# Patient Record
Sex: FEMALE | Race: ASIAN | URBAN - METROPOLITAN AREA
[De-identification: names, ages, dates, MRNs, and addresses within clinical notes are randomized per-mention and may not be internally consistent; named-entity substitution may affect disease eponyms.]

---

## 2017-01-06 ENCOUNTER — OFFICE VISIT (OUTPATIENT)
Dept: FAMILY MEDICINE | Facility: CLINIC | Age: 42
End: 2017-01-06

## 2017-01-06 ENCOUNTER — MEDICAL CORRESPONDENCE (OUTPATIENT)
Dept: HEALTH INFORMATION MANAGEMENT | Facility: CLINIC | Age: 42
End: 2017-01-06

## 2017-01-06 VITALS
WEIGHT: 105 LBS | OXYGEN SATURATION: 99 % | DIASTOLIC BLOOD PRESSURE: 73 MMHG | HEART RATE: 63 BPM | HEIGHT: 58 IN | BODY MASS INDEX: 22.04 KG/M2 | TEMPERATURE: 97.8 F | SYSTOLIC BLOOD PRESSURE: 111 MMHG

## 2017-01-06 DIAGNOSIS — G44.219 EPISODIC TENSION-TYPE HEADACHE, NOT INTRACTABLE: ICD-10-CM

## 2017-01-06 DIAGNOSIS — F33.1 MODERATE EPISODE OF RECURRENT MAJOR DEPRESSIVE DISORDER (H): Primary | ICD-10-CM

## 2017-01-06 ASSESSMENT — ANXIETY QUESTIONNAIRES
3. WORRYING TOO MUCH ABOUT DIFFERENT THINGS: NOT AT ALL
GAD7 TOTAL SCORE: 1
5. BEING SO RESTLESS THAT IT IS HARD TO SIT STILL: NOT AT ALL
6. BECOMING EASILY ANNOYED OR IRRITABLE: NOT AT ALL
1. FEELING NERVOUS, ANXIOUS, OR ON EDGE: SEVERAL DAYS
IF YOU CHECKED OFF ANY PROBLEMS ON THIS QUESTIONNAIRE, HOW DIFFICULT HAVE THESE PROBLEMS MADE IT FOR YOU TO DO YOUR WORK, TAKE CARE OF THINGS AT HOME, OR GET ALONG WITH OTHER PEOPLE: SOMEWHAT DIFFICULT
2. NOT BEING ABLE TO STOP OR CONTROL WORRYING: NOT AT ALL
7. FEELING AFRAID AS IF SOMETHING AWFUL MIGHT HAPPEN: NOT AT ALL

## 2017-01-06 ASSESSMENT — PATIENT HEALTH QUESTIONNAIRE - PHQ9: 5. POOR APPETITE OR OVEREATING: NOT AT ALL

## 2017-01-06 NOTE — PROGRESS NOTES
Primary Care Behavioral Health Consult Note    Meeting was: unscheduled  Others present:  (Janelle Tim) and patient's son  Meeting lasted: 28 minutes    Identifying Information and Presenting Problem:    Dr. Hughes requested behavioral health consultation for this patient regarding pain management strategies and possible connection with mental health services.  The patient is a 42 year old Zoë female and agreed to be seen by behavioral health today.    Topics Discussed/Interventions Provided:       History of pain: Ma has been having headaches since the birth of her son. She believes that the headaches are caused by too much blood rushing to her head and not being in balance.     Helpful pain management strategies: (1) Medications; (2) In [place] she used herbal head wrap for headaches, but she does not know where she could receive this service in the [facility]; (3) OMT at [place]; (4) her oldest daughter pulls on her hair when headaches are severe; (5) she also demonstrated cracking her neck during our visit today when asked if she uses stretching.     Psychoeducation: Discussed interaction between pain and stress and how relaxation helps reduce both. Used her own theory for cause of pain to adapt a visualization relaxation technique. Encouraged her to rest 5 minutes daily and visualize blood flow going from her head back into her legs and feet. Also discussed enlisting her  to provide childcare during relaxation practice - she believes he'll be willing to help.     Referral: Provided referral information for [facility] to continue meeting with Ma to develop stress and pain coping strategies. She was a bit ambivalent about accepting the referral because she has never done any type of counseling, but was willing to sign the release and have them contact her. May need to revisit this referral again in the future if she does not successfully connect with them. Seems that mood and stress are playing a role  "in her experience of pain. Alternatively, she could be referred to in-house lifestyle clinic for pain management.    Assessment:     Mental Status: Romario Cervantes appeared generally alert and oriented. Dress was casual and appropriate to the weather and occasion. Grooming and hygiene were adequate. Eye contact was good. Speech was of normal volume and rate and was clear, coherent, and relevant. Mood was \"okay\" and affect appeared depressed. Thought processes were relevant, logical and goal-directed - as best can be estimated with help of . Thought content was WNL with no evidence of psychotic or paranoid features. No evidence of SI/HI or self-harm, intent, or plans. Memory appeared grossly intact. Insight and judgment appeared limited and patient exhibited good impulse control during the appointment.     Does the patient appear to be at imminent risk of harm to self/others at this time? No      PHQ-9 SCORE 10/7/2016 12/12/2016   Total Score 8 11       Diagnoses (PROVISIONAL):      Headaches  R/O Depression    Plan:      Obtained [facility] and will refer for therapy for stress coping skills. Faxed over referral after visit.     Spoke with Dr. Hughes to relay plan from today's visit for Ma to practice relaxation with visualization of blood flowing into her lower extremities 5 minutes daily.     Recommend considering referral for in-house OMT, if available.    Could refer patient for in house lifestyle clinic focused on pain management      Connie Cowan, Ph.D.  Behavioral Health Fellow    "

## 2017-01-06 NOTE — PATIENT INSTRUCTIONS
Please bring in all your medicines at your next visit    Continue taking all your mediications as directed    I would like you to see one of your psychiatrist    Reture to to clinic as needed    MENTAL HEALTH REFERRAL  Dr. Cowan sent referral to [facility].  Jazmine Roth  1/10/17

## 2017-01-06 NOTE — NURSING NOTE
name: Janelle Tim  Language: Zoë  Agency: [facility], [place]  Phone number: [phone]

## 2017-01-06 NOTE — Clinical Note
Updated to include the lifestyle recommendation. I also sent a message to Dr. Hughes so it's on her radar. In retrospect that might have been a better first step. Oh well, we'll see how she reacts to [facility].

## 2017-01-06 NOTE — Clinical Note
Dr. Saul COLBY. Will you place a mental health referral so that it's documented (in case her insurance requires it)?  Thanks!

## 2017-01-06 NOTE — Clinical Note
Hi!  This patient typically comes with [facility] Fox.  Can you have him help set up OMT visit (preferably with Dr. Jacobo or Bonilla, as he has seen them in the past), and follow up with me in 1 month?

## 2017-01-07 ASSESSMENT — PATIENT HEALTH QUESTIONNAIRE - PHQ9: SUM OF ALL RESPONSES TO PHQ QUESTIONS 1-9: 7

## 2017-01-07 ASSESSMENT — ANXIETY QUESTIONNAIRES: GAD7 TOTAL SCORE: 1

## 2017-01-07 NOTE — PROGRESS NOTES
SUBJECTIVE:  This is a 42-year-old female with history of chronic severe headaches, likely somatoform disorder, and likely major depression, who presents today for followup on headaches.  She says she feels somewhat better over the last few weeks.  She hasn't had a headache in the last week.  She noticed some weakness in the left arm and continues to have stiffness in the neck, as well as dizziness.  She feels that the medications are helping, but when asked how she takes them, it isn't clear which one she takes p.r.n. and which she takes regularly.  She doesn't have any of her medications with her today.  She says there is a small pink to brownish pill that she finds most helpful. She still takes Tylenol p.r.n. and the medication for gas and stomach upset.      She has a couple of other things going on.  She gets weakness and numbness in the left arm that happens occasionally.  She also notices that when she becomes scared or alarmed, she gets cold in the feet and hands.  This happens when the alarm goes off in her apartment complex or when something happens that concerns her about her children.  She thinks that this might be connected with her headache.  When she is notices the cold in the extremities, the headache is often worse.       She said she hasn't felt right for many years.  She said it started when she gave birth to one of the older children, the fifth child, who is now 12 years old.  When this happened, she lost a lot of blood and had to follow up regularly for hemoglobins and blood pressures.  She felt quite sick after that and that is when things started.  She became pregnant again and had a miscarriage, and that was concerning.  Headaches got worse after that, and when her youngest son (who is about five and is in the room with her today) was born that is when headaches got really bad.  She says that when she gave birth, she had a problem with too much blood going up into the head and they gave her  topical medications around the head to help with the pressure and pain.  Prior to the birth of the five-year-old, she was able to take paracetamol or Tylenol and the headaches got better.     We discussed that sometimes mood and stress can add to pain, especially to headaches.  I asked her if she was interested in doing psychotherapy.  She said that she may already be getting some kind of therapy.  She said that someone came to the house to do an evaluation and spoke with her daughter, but she doesn't recall getting actual therapy with that visit.  She is unsure what the name of the person was or how frequently they came to see her.  She is somewhat skeptical that this could be helpful in treating her pain, but she endorses the connection between times when she is alarmed and worried and her headache  .     We were able to have Dr. Cowan see her for evaluation.  She recommended [facility] for [facility] as well as an evaluation for therapy.  OMT was quite beneficial to the patient in the past, so it might be a good option for treatment.     OBJECTIVE:   VITAL SIGNS:  Reviewed.  Blood pressure is within the normal range.   GENERAL:   Fatigued-appearing Zoë female in no acute distress.   HEENT:  Pupils are equal and reactive.  Eye movements are normal.  No conjunctival erythema.  Sensation is intact.  Normal cranial nerve strength.  Normal hearing.   CARDIAC:  Heart has regular rate and rhythm with no murmurs, rubs, or gallops.   RESPIRATORY:  Lungs are clear to auscultation bilaterally with no crackles or wheezes.     LABORATORY DATA:  Labs were discussed with her from last visit.  They showed normal BMP, normal LFTs, normal A1C, normal TSH, and normal blood counts, including hemoglobin of 13 and stable.  I also reviewed an MRI of the head for evaluation of headaches and this was normal.  No evidence of ischemia, hemorrhage, or mass, although it showed mild ethmoid thickening.     ASSESSMENT AND PLAN:  A 42-year-old female  presenting for followup of chronic headaches.   1.  Chronic tension headaches.  Chronic with likely somatic component.  She had relatively good improvement with Zonegran.  The patient doesn't have her medications with her today; I'll have her continue her current medications without change until medications can be reviewed. .  I discontinued some from her med list that would be very old at this point, but I recommend that she bring her medications in next time and that we do complete medication reconciliation.   2.  History of what sounds like postpartum hemorrhage, with hypotension and anemia.  Blood counts were normal at last visit.  I think she might have had a PTSD response to the incident.  I recommended therapy.  We put referral in today and I appreciate the assistance of Dr. Cowan.     Typically, we would treat her with margareth chi and use this to reach out to the patient to help her schedule an OMT visit.  She saw Dr. Jacobo quite a bit in the past and maybe that would be a good fit to provide OMT support.  I'll reach out to him and see if he would be interested.     Morena Hughes

## 2017-01-07 NOTE — PROGRESS NOTES
I have reviewed and agree with the behavioral health fellow's documentation for this visit.  I did not personally see the patient.  Vilma Hassan, PhD., LP

## 2017-01-23 ENCOUNTER — OFFICE VISIT (OUTPATIENT)
Dept: FAMILY MEDICINE | Facility: CLINIC | Age: 42
End: 2017-01-23

## 2017-01-23 VITALS
OXYGEN SATURATION: 100 % | TEMPERATURE: 97.6 F | BODY MASS INDEX: 21.49 KG/M2 | DIASTOLIC BLOOD PRESSURE: 80 MMHG | HEART RATE: 70 BPM | SYSTOLIC BLOOD PRESSURE: 126 MMHG | WEIGHT: 102.38 LBS | HEIGHT: 58 IN

## 2017-01-23 DIAGNOSIS — M99.00 SOMATIC DYSFUNCTION OF HEAD REGION: Primary | ICD-10-CM

## 2017-01-23 DIAGNOSIS — M99.9 NONALLOPATHIC LESION OF UPPER EXTREMITIES: ICD-10-CM

## 2017-01-23 DIAGNOSIS — M99.9 NONALLOPATHIC LESION OF CERVICAL REGION: ICD-10-CM

## 2017-01-23 NOTE — PROGRESS NOTES
OMT Visit    Romario Cervantes is a 42 year old year old female who is being seen today for OMT for treatment of tension headaches.    Chief Complaint: Patient presents with:  Other: OMT      Past Medical History   Diagnosis Date     Chronic daily headache 2/24/2016       Social History     Social History     Marital Status:      Spouse Name: N/A     Number of Children: N/A     Years of Education: N/A     Social History Main Topics     Smoking status: Former Smoker     Smokeless tobacco: None      Comment: quit betel nut     Alcohol Use: No     Drug Use: No     Sexual Activity: Not Asked     Other Topics Concern     None     Social History Narrative         Current outpatient prescriptions:      ranitidine (ZANTAC) 150 MG tablet, Take 1 tablet (150 mg) by mouth At Bedtime, Disp: 90 tablet, Rfl: 1     zonisamide (ZONEGRAN) 100 MG capsule, Take 2 capsules (200 mg) by mouth daily, Disp: 30 capsule, Rfl: 0     calcium carbonate (TUMS) 500 MG chewable tablet, Take 1 tablet (500 mg) by mouth every 2 hours as needed for heartburn, Disp: 150 tablet, Rfl: 1     ibuprofen (ADVIL,MOTRIN) 600 MG tablet, Take 1 tablet (600 mg) by mouth every 6 hours as needed for moderate pain, Disp: 90 tablet, Rfl: 1     medroxyPROGESTERone (DEPO-PROVERA) 150 MG/ML injection, Inject 1 mL (150 mg) into the muscle every 3 months, Disp: 3 mL, Rfl: 3     acetaminophen (TYLENOL) 500 MG tablet, Take 2 tablets (1,000 mg) by mouth every 6 hours as needed for mild pain Max 6 tablets (3000mg) per day., Disp: 100 tablet, Rfl: 11     meclizine 25 MG CHEW, Take 1 tablet (25 mg) by mouth every 6 hours as needed, Disp: 90 tablet, Rfl: 1    Exam    General: alert, pleasant and interactive  Psych: positive mood and affect and well-groomed  Gross Exam: unlevel shoulder heights R>L  Neck: muscle spasms and tenderness suboccipital, scalene, sternoclidomastoid and right trapezius   Cervical Dysfunction: Paraspinal Muscle: left, right, fullness and  tenderness  Thoracic Dysfunction: Paraspinal Muscle: left, right, fullness and tenderness    Treatment    Verbal consent obtained to proceed with OMT treatment.    Osteopathic manipulative treatment was performed to 4 body regions including:   -head (OA)  -Cervical  -Right Upper extremity    (M99.00) Somatic dysfunction of head region  (primary encounter diagnosis)  Plan:   -OSTEOPATHIC MANIP,3-4 BODY REGN  -OA treated with suboccipital release    (M99.9) Nonallopathic lesion of cervical region  Plan: OSTEOPATHIC MANIP,3-4 BODY REGN  -cervical spine treated with direct myofascial release    (M99.07) Somatic dysfunction of upper extremities  Plan: OSTEOPATHIC MANIP,3-4 BODY REGN  -RIght trapezius muscle treated with counterstrain    Patient tolerated procedure well, reported pain scale 6/10 before treatment and 2/10 after treatment. Encouraged to follow up as needed for OMT if she continues to experience neck/back pain and headaches. Also encouraged use of heating pad as well as stretches taught during visit at home.    Kami Crystal DO

## 2017-01-24 ASSESSMENT — PATIENT HEALTH QUESTIONNAIRE - PHQ9: SUM OF ALL RESPONSES TO PHQ QUESTIONS 1-9: 6

## 2017-01-30 NOTE — PROGRESS NOTES
Preceptor attestation:  Patient seen and discussed with the resident.  Assessment and plan reviewed with resident and agreed upon.  Supervising physician: Brien Bowman  [facility]

## 2017-02-01 ENCOUNTER — OFFICE VISIT (OUTPATIENT)
Dept: FAMILY MEDICINE | Facility: CLINIC | Age: 42
End: 2017-02-01

## 2017-02-01 VITALS
OXYGEN SATURATION: 100 % | HEART RATE: 76 BPM | BODY MASS INDEX: 21.26 KG/M2 | SYSTOLIC BLOOD PRESSURE: 106 MMHG | DIASTOLIC BLOOD PRESSURE: 70 MMHG | TEMPERATURE: 97.9 F | HEIGHT: 58 IN | WEIGHT: 101.25 LBS

## 2017-02-01 DIAGNOSIS — F32.1 MODERATE SINGLE CURRENT EPISODE OF MAJOR DEPRESSIVE DISORDER (H): ICD-10-CM

## 2017-02-01 DIAGNOSIS — R51.9 CHRONIC DAILY HEADACHE: Primary | ICD-10-CM

## 2017-02-01 NOTE — MR AVS SNAPSHOT
After Visit Summary   2017    Romario Cervantes    MRN: [id number]           Patient Information     Date Of Birth          1975        Visit Information        Provider Department      2017 1:10 PM Morena Hughes MD [facility]        Care Instructions    Continue same medications.    Follow up with Dr. Crystal every 2 weeks for OMT  Follow up with Dr. Hughes in 2 months.          Follow-ups after your visit        Who to contact     Please call your clinic at [phone] to:    Ask questions about your health    Make or cancel appointments    Discuss your medicines    Learn about your test results    Speak to your doctor   If you have compliments or concerns about an experience at your clinic, or if you wish to file a complaint, please contact [facility] Patient Relations at [phone] or email us at [email or web address]         Additional Information About Your Visit        MyChart Information     [employer] is an electronic gateway that provides easy, online access to your medical records. With [employer], you can request a clinic appointment, read your test results, renew a prescription or communicate with your care team.     To sign up for Connot[employer]et visit the website at [email or web address]   You will be asked to enter the access code listed below, as well as some personal information. Please follow the directions to create your username and password.     Your access code is: [email or web address]  Expires: 2017  1:55 PM     Your access code will  in 90 days. If you need help or a new code, please contact your [facility] Clinic or call [phone] for assistance.        Care EveryWhere ID     This is your Care EveryWhere ID. This could be used by other organizations to access your [place] medical records  [id number]        Your Vitals Were     Pulse Temperature Height BMI (Body Mass Index) Pulse Oximetry  "Breastfeeding?    76 97.9  F (36.6  C) (Oral) 4' 9.75\" (146.7 cm) 21.34 kg/m2 100% No       Blood Pressure from Last 3 Encounters:   02/01/17 106/70   01/23/17 126/80   01/06/17 111/73    Weight from Last 3 Encounters:   02/01/17 101 lb 4 oz (45.927 kg)   01/23/17 102 lb 6 oz (46.437 kg)   01/06/17 105 lb (47.628 kg)              Today, you had the following     No orders found for display       Primary Care Provider Office Phone # Fax #    Morena Hughes [phone]       [facility] [address] [place] [address]        Thank you!     Thank you for choosing [facility]  for your care. Our goal is always to provide you with excellent care. Hearing back from our patients is one way we can continue to improve our services. Please take a few minutes to complete the written survey that you may receive in the mail after your visit with us. Thank you!             Your Updated Medication List - Protect others around you: Learn how to safely use, store and throw away your medicines at www.disposemymeds.org.          This list is accurate as of: 2/1/17  1:55 PM.  Always use your most recent med list.                   Brand Name Dispense Instructions for use    acetaminophen 500 MG tablet    TYLENOL    100 tablet    Take 2 tablets (1,000 mg) by mouth every 6 hours as needed for mild pain Max 6 tablets (3000mg) per day.       calcium carbonate 500 MG chewable tablet    TUMS    150 tablet    Take 1 tablet (500 mg) by mouth every 2 hours as needed for heartburn       ibuprofen 600 MG tablet    ADVIL/MOTRIN    90 tablet    Take 1 tablet (600 mg) by mouth every 6 hours as needed for moderate pain       meclizine 25 MG Chew     90 tablet    Take 1 tablet (25 mg) by mouth every 6 hours as needed       medroxyPROGESTERone 150 MG/ML injection    DEPO-PROVERA    3 mL    Inject 1 mL (150 mg) into the muscle every 3 months       ranitidine 150 MG tablet    ZANTAC    90 tablet    Take 1 tablet (150 mg) " by mouth At Bedtime       zonisamide 100 MG capsule    ZONEGRAN    30 capsule    Take 2 capsules (200 mg) by mouth daily

## 2017-02-01 NOTE — PATIENT INSTRUCTIONS
Continue same medications.    Follow up with Dr. Crystal every 2 weeks for OMT  Follow up with Dr. Hughes in 2 months.

## 2017-02-02 ASSESSMENT — PATIENT HEALTH QUESTIONNAIRE - PHQ9: SUM OF ALL RESPONSES TO PHQ QUESTIONS 1-9: 7

## 2017-02-02 NOTE — PROGRESS NOTES
"    There are no exam notes on file for this visit.  Chief Complaint   Patient presents with     Headache     follow up - a little bit better     Blood pressure 106/70, pulse 76, temperature 97.9  F (36.6  C), temperature source Oral, height 4' 9.75\" (146.7 cm), weight 101 lb 4 oz (45.927 kg), SpO2 100 %, not currently breastfeeding.    SUBJECTIVE:  This is a 42-year-old female with history of likely major depression and chronic headaches protect presents for followup.  She explains that since our last visit headaches overall haven't been bad.  She still feels very, very tired and fatigued, frequently on daily basis.  She stated that she has no energy and has difficulty sleeping at times.  Typically, the problem is with falling asleep.  She tries to go to bed when it gets dark in the evening, but she isn't able to sleep until midnight or 1:00 a.m.  This isn't an everyday thing.  Even when she gets enough sleep, she still feels worn out.  Headaches are much less regular.  When she gets them, they will last about two or three days, but then they will go away for a week or sometimes longer, and headaches aren't as frequent.       She continues to relate that this is an old problem that started after her youngest baby was born in [facility] [place].  Something happened with the delivery that caused too much fluid to her head and caused this problem.  She thinks she was too old when she had the baby.  She also continues to relate difficulties from an earlier miscarriage, as well as bleeding during delivery.  Feels that the lack of blood makes her cold and fatigued.       She feels that the medications help.  Interestingly, she says she takes her medicines every day, although she doesn't have them with her.  She shouldn't have refills left of Zonegran; I question whether she takes it on a regular basis.  She no longer takes ranitidine, per her report.  She saw Dr. Crystal for an OMT appointment, which was quite helpful.  She says " that she feels much better after the manipulation and the improvement in symptoms lasted for at least a week.  She hopes to continue this on a regular basis.     She gets mental health support at [facility].  She showed me a card today with an appointment for 2:00 p.m. on Friday 01/10 with provider Quinn Richards.  She feels that she is able to talk there, but it doesn't always help her problems.  She wonders if she can come here for those services, because then all of her care would be in one place.  I talked with Dr. Cowan, who saw her last time, and there currently aren't openings here.  Dr. Cowan feels that she should continue to receive her mental health care at [facility], as they can provide more continuous long-term care for her.     OBJECTIVE:   VITAL SIGNS:  Reviewed and they are within the normal range.  Blood pressure is appropriate.   GENERAL:  Comfortable-appearing, Zoë female in no acute distress.   PSYCHIATRIC:  Mood and affect are somewhat irritable.  She is easily distracted during our conversation.  No acute distress.     No further examination was completed today.     ASSESSMENT AND PLAN:  Romario Cervantes is a 42-year-old female presenting for followup on headaches and likely depression.   1.  Chronic headaches.  These are improving.  I recommended that she continue with the same medicines; however, she would be out of the Zonegran.  I've asked her to bring her medications with her to next visit so we can confirm these.  She says she doesn't need refills, so no further medications were sent.  We'll continue OMT, as this is quite beneficial.  She'll schedule this every two weeks with Dr. Crystal.   2.  Major depression.  She had positive Zoë screening today.  She had a positive PHQ-9.  She gets care at [facility].  She signed a release of information so we can discuss and coordinate her care.       She'll follow up with me in two months for recheck.     Morena Hughes      Patient Instructions   Continue same  medications.    Follow up with Dr. Crystal every 2 weeks for OMT  Follow up with Dr. Hughes in 2 months.

## 2017-02-03 PROBLEM — F32.1 MODERATE SINGLE CURRENT EPISODE OF MAJOR DEPRESSIVE DISORDER (H): Status: ACTIVE | Noted: 2017-02-03

## 2017-02-20 ENCOUNTER — OFFICE VISIT (OUTPATIENT)
Dept: FAMILY MEDICINE | Facility: CLINIC | Age: 42
End: 2017-02-20

## 2017-02-20 VITALS
HEART RATE: 78 BPM | TEMPERATURE: 97.7 F | WEIGHT: 100.25 LBS | BODY MASS INDEX: 21.13 KG/M2 | DIASTOLIC BLOOD PRESSURE: 66 MMHG | OXYGEN SATURATION: 100 % | SYSTOLIC BLOOD PRESSURE: 102 MMHG

## 2017-02-20 DIAGNOSIS — M99.9 NONALLOPATHIC LESION OF THORACIC REGION: ICD-10-CM

## 2017-02-20 DIAGNOSIS — M99.9 NONALLOPATHIC LESION OF CERVICAL REGION: ICD-10-CM

## 2017-02-20 DIAGNOSIS — M99.00 SOMATIC DYSFUNCTION OF HEAD REGION: ICD-10-CM

## 2017-02-20 DIAGNOSIS — Z30.42 ENCOUNTER FOR SURVEILLANCE OF INJECTABLE CONTRACEPTIVE: Primary | ICD-10-CM

## 2017-02-20 RX ORDER — MEDROXYPROGESTERONE ACETATE 150 MG/ML
150 INJECTION, SUSPENSION INTRAMUSCULAR
Qty: 1 ML | Refills: 1 | OUTPATIENT
Start: 2017-02-20

## 2017-02-20 NOTE — PROGRESS NOTES
Preceptor attestation:  Patient seen and discussed with the resident. Assessment and plan reviewed with resident and agreed upon.  Supervising physician: Rishabh Christine  [facility]

## 2017-02-20 NOTE — MR AVS SNAPSHOT
After Visit Summary   2/20/2017    Romario Cervantes    MRN: [id number]           Patient Information     Date Of Birth          1975        Visit Information        Provider Department      2/20/2017 1:10 PM Kami Crystal DO [facility]        Today's Diagnoses     Encounter for surveillance of injectable contraceptive    -  1    Somatic dysfunction of head region        Nonallopathic lesion of cervical region        Nonallopathic lesion of thoracic region           Follow-ups after your visit        Follow-up notes from your care team     Return in about 3 months (around 5/20/2017) for depo.      Your next 10 appointments already scheduled     Mar 09, 2017  1:10 PM CST   RETURN EXTENDED with Kami Crystal DO   [facility] ([facility])    [address] [place] [id number]   [phone]            Mar 29, 2017 11:00 AM CDT   Return Visit with Morena Hughes MD   [facility] ([facility])    [address] [place] [address]   [phone]              Who to contact     Please call your clinic at [phone] to:    Ask questions about your health    Make or cancel appointments    Discuss your medicines    Learn about your test results    Speak to your doctor   If you have compliments or concerns about an experience at your clinic, or if you wish to file a complaint, please contact [facility] Patient Relations at [phone] or email us at [email or web address]         Additional Information About Your Visit        MyChart Information     [employer] is an electronic gateway that provides easy, online access to your medical records. With [employer], you can request a clinic appointment, read your test results, renew a prescription or communicate with your care team.     To sign up for Yextt visit the website at [email or web address]   You will be asked to enter the access code listed below, as well as some personal information.  Please follow the directions to create your username and password.     Your access code is: [email or web address]  Expires: 2017  1:55 PM     Your access code will  in 90 days. If you need help or a new code, please contact your [facility] or call [phone] for assistance.        Care EveryWhere ID     This is your Care EveryWhere ID. This could be used by other organizations to access your [place] medical records  [id number]        Your Vitals Were     Pulse Temperature Pulse Oximetry Breastfeeding? BMI (Body Mass Index)       78 97.7  F (36.5  C) (Oral) 100% No 21.13 kg/m2        Blood Pressure from Last 3 Encounters:   17 102/66   17 106/70   17 126/80    Weight from Last 3 Encounters:   17 100 lb 4 oz (45.5 kg)   17 101 lb 4 oz (45.9 kg)   17 102 lb 6 oz (46.4 kg)              We Performed the Following     INJECTION INTRAMUSCULAR OR SUB-Q     medroxyPROGESTERone (DEPO-PROVERA) injection 150 mg (Charge)     OSTEOPATHIC MANIP,3-4 BODY REGN          Today's Medication Changes          These changes are accurate as of: 17  4:55 PM.  If you have any questions, ask your nurse or doctor.               These medicines have changed or have updated prescriptions.        Dose/Directions    * medroxyPROGESTERone 150 MG/ML injection   Commonly known as:  DEPO-PROVERA   This may have changed:  Another medication with the same name was added. Make sure you understand how and when to take each.   Used for:  Birth control        Dose:  150 mg   Inject 1 mL (150 mg) into the muscle every 3 months   Quantity:  3 mL   Refills:  3       * medroxyPROGESTERone 150 MG/ML injection   Commonly known as:  DEPO-PROVERA   This may have changed:  You were already taking a medication with the same name, and this prescription was added. Make sure you understand how and when to take each.   Used for:  Encounter for surveillance of injectable contraceptive         Dose:  150 mg   Inject 1 mL (150 mg) into the muscle every 3 months   Quantity:  1 mL   Refills:  1       * Notice:  This list has 2 medication(s) that are the same as other medications prescribed for you. Read the directions carefully, and ask your doctor or other care provider to review them with you.         Where to get your medicines      Some of these will need a paper prescription and others can be bought over the counter.  Ask your nurse if you have questions.     You don't need a prescription for these medications     medroxyPROGESTERone 150 MG/ML injection                Primary Care Provider Office Phone # Fax #    Morena Hughes [phone]       [facility] [address] [place] [address]        Thank you!     Thank you for choosing [facility]  for your care. Our goal is always to provide you with excellent care. Hearing back from our patients is one way we can continue to improve our services. Please take a few minutes to complete the written survey that you may receive in the mail after your visit with us. Thank you!             Your Updated Medication List - Protect others around you: Learn how to safely use, store and throw away your medicines at www.disposemymeds.org.          This list is accurate as of: 2/20/17  4:55 PM.  Always use your most recent med list.                   Brand Name Dispense Instructions for use    acetaminophen 500 MG tablet    TYLENOL    100 tablet    Take 2 tablets (1,000 mg) by mouth every 6 hours as needed for mild pain Max 6 tablets (3000mg) per day.       calcium carbonate 500 MG chewable tablet    TUMS    150 tablet    Take 1 tablet (500 mg) by mouth every 2 hours as needed for heartburn       ibuprofen 600 MG tablet    ADVIL/MOTRIN    90 tablet    Take 1 tablet (600 mg) by mouth every 6 hours as needed for moderate pain       meclizine 25 MG Chew     90 tablet    Take 1 tablet (25 mg) by mouth every 6 hours as needed       *  medroxyPROGESTERone 150 MG/ML injection    DEPO-PROVERA    3 mL    Inject 1 mL (150 mg) into the muscle every 3 months       * medroxyPROGESTERone 150 MG/ML injection    DEPO-PROVERA    1 mL    Inject 1 mL (150 mg) into the muscle every 3 months       ranitidine 150 MG tablet    ZANTAC    90 tablet    Take 1 tablet (150 mg) by mouth At Bedtime       zonisamide 100 MG capsule    ZONEGRAN    30 capsule    Take 2 capsules (200 mg) by mouth daily       * Notice:  This list has 2 medication(s) that are the same as other medications prescribed for you. Read the directions carefully, and ask your doctor or other care provider to review them with you.

## 2017-02-20 NOTE — PROGRESS NOTES
OMT Visit    Romario Cervantes is a 42 year old year old female who is being seen today for OMT. I saw her for OMT a few weeks ago, since that time she reports improvement in her headaches and neck pain.    She is also due for a depo shot today, last depo was 3 months ago, would like to continue with this for contraception.    Chief Complaint: Patient presents with:  Other: OMT  Contraception: depo-provera      Past Medical History   Diagnosis Date     Chronic daily headache 2/24/2016     Moderate single current episode of major depressive disorder (H) 2/3/2017       Social History     Social History     Marital status:      Spouse name: N/A     Number of children: N/A     Years of education: N/A     Social History Main Topics     Smoking status: Former Smoker     Smokeless tobacco: Not on file      Comment: quit betel nut     Alcohol use No     Drug use: No     Sexual activity: Not on file     Other Topics Concern     Not on file     Social History Narrative         Current Outpatient Prescriptions:      zonisamide (ZONEGRAN) 100 MG capsule, Take 2 capsules (200 mg) by mouth daily, Disp: 30 capsule, Rfl: 0     calcium carbonate (TUMS) 500 MG chewable tablet, Take 1 tablet (500 mg) by mouth every 2 hours as needed for heartburn, Disp: 150 tablet, Rfl: 1     acetaminophen (TYLENOL) 500 MG tablet, Take 2 tablets (1,000 mg) by mouth every 6 hours as needed for mild pain Max 6 tablets (3000mg) per day., Disp: 100 tablet, Rfl: 11     meclizine 25 MG CHEW, Take 1 tablet (25 mg) by mouth every 6 hours as needed, Disp: 90 tablet, Rfl: 1     ibuprofen (ADVIL,MOTRIN) 600 MG tablet, Take 1 tablet (600 mg) by mouth every 6 hours as needed for moderate pain, Disp: 90 tablet, Rfl: 1     medroxyPROGESTERone (DEPO-PROVERA) 150 MG/ML injection, Inject 1 mL (150 mg) into the muscle every 3 months, Disp: 3 mL, Rfl: 3     ranitidine (ZANTAC) 150 MG tablet, Take 1 tablet (150 mg) by mouth At Bedtime, Disp: 90 tablet, Rfl:  1    Exam    General: alert, pleasant and interactive  Psych: positive mood and affect and well-groomed  Gross Exam: unlevel shoulder heights R>L  Neck: muscle spasms and tenderness suboccipital, scalene, sternoclidomastoid and levator scapula and limited ROM rotation left  Cervical Dysfunction: Paraspinal Muscle: left, right, fullness and tenderness  Thoracic Dysfunction: Paraspinal Muscle: left, right, fullness and tenderness    Treatment    Verbal consent obtained to proceed with OMT treatment.    Osteopathic manipulative treatment was performed to 4 body regions including:   -head (OA)  -Cervical  -Thoracic    (M99.00) Somatic dysfunction of head region  (primary encounter diagnosis)  Plan:   -OSTEOPATHIC MANIP,3-4 BODY REGN  -OA treated with suboccipital release    (M99.9) Nonallopathic lesion of cervical region  Plan: OSTEOPATHIC MANIP,3-4 BODY REGN  -cervical spine treated with direct myofascial release and muscle energy    (M99.02) Nonallopathic lesion of thoracic region  Plan: OSTEOPATHIC MANIP,3-4 BODY REGN  -upper thoracic spine treated with direct myofascial release    (Z30.42) Encounter for surveillance of injectable contraceptive  (primary encounter diagnosis)  Comment:  Now for subsequent Depo  Plan: medroxyPROGESTERone (DEPO-PROVERA) 150 MG/ML         Injection  -plan to return for depo shot every 3 months      Patient tolerated procedure well, reported pain scale 6/10 before treatment and 2/10 after treatment. Encouraged to follow up as needed for OMT if she continues to experience neck/pack pain and headaches. Also encouraged use of ice/heating pad as well as stretches taught during visit at home.    Kami Crystal DO

## 2017-02-20 NOTE — NURSING NOTE
I administered the following to Romario Cervantes.  MEDICATION: Depo Provera 150mg  ROUTE: IM  SITE: Deltoid - Right  DOSE: 150 mg  LOT #: [id number]  :  [employer]   EXPIRATION DATE:  05/2021  NDC#: [address]-2   Next injection May 8 - 22, 2017  Was entire vial of medication used? Yes  Name of provider who requested the injection: Dr. Hughes  Name of provider on site at the time of performing the injection: Dr. Nanci Guerrier, [facility]

## 2017-03-09 ENCOUNTER — OFFICE VISIT (OUTPATIENT)
Dept: FAMILY MEDICINE | Facility: CLINIC | Age: 42
End: 2017-03-09

## 2017-03-09 VITALS
SYSTOLIC BLOOD PRESSURE: 113 MMHG | TEMPERATURE: 99.2 F | DIASTOLIC BLOOD PRESSURE: 71 MMHG | HEART RATE: 62 BPM | BODY MASS INDEX: 21.42 KG/M2 | WEIGHT: 101.6 LBS

## 2017-03-09 DIAGNOSIS — M99.9 NONALLOPATHIC LESION OF CERVICAL REGION: ICD-10-CM

## 2017-03-09 DIAGNOSIS — M99.9 NONALLOPATHIC LESION OF UPPER EXTREMITIES: ICD-10-CM

## 2017-03-09 DIAGNOSIS — M99.00 SOMATIC DYSFUNCTION OF HEAD REGION: ICD-10-CM

## 2017-03-09 NOTE — PROGRESS NOTES
OMT Visit    Romario Cervantes is a 42 year old year old female who is being seen today for OMT. Suffers from tension HA and neck pain, this has been improving with OMT.    Chief Complaint: Patient presents with:  Manipulation      Past Medical History   Diagnosis Date     Chronic daily headache 2/24/2016     Moderate single current episode of major depressive disorder (H) 2/3/2017       Social History     Social History     Marital status:      Spouse name: N/A     Number of children: N/A     Years of education: N/A     Social History Main Topics     Smoking status: Former Smoker     Smokeless tobacco: None      Comment: quit betel nut     Alcohol use No     Drug use: No     Sexual activity: Not Asked     Other Topics Concern     None     Social History Narrative         Current Outpatient Prescriptions:      medroxyPROGESTERone (DEPO-PROVERA) 150 MG/ML injection, Inject 1 mL (150 mg) into the muscle every 3 months, Disp: 1 mL, Rfl: 1     ranitidine (ZANTAC) 150 MG tablet, Take 1 tablet (150 mg) by mouth At Bedtime, Disp: 90 tablet, Rfl: 1     zonisamide (ZONEGRAN) 100 MG capsule, Take 2 capsules (200 mg) by mouth daily, Disp: 30 capsule, Rfl: 0     calcium carbonate (TUMS) 500 MG chewable tablet, Take 1 tablet (500 mg) by mouth every 2 hours as needed for heartburn, Disp: 150 tablet, Rfl: 1     acetaminophen (TYLENOL) 500 MG tablet, Take 2 tablets (1,000 mg) by mouth every 6 hours as needed for mild pain Max 6 tablets (3000mg) per day., Disp: 100 tablet, Rfl: 11     meclizine 25 MG CHEW, Take 1 tablet (25 mg) by mouth every 6 hours as needed, Disp: 90 tablet, Rfl: 1     ibuprofen (ADVIL,MOTRIN) 600 MG tablet, Take 1 tablet (600 mg) by mouth every 6 hours as needed for moderate pain, Disp: 90 tablet, Rfl: 1     medroxyPROGESTERone (DEPO-PROVERA) 150 MG/ML injection, Inject 1 mL (150 mg) into the muscle every 3 months, Disp: 3 mL, Rfl: 3    Exam    General: alert, pleasant and interactive  Psych: positive mood  and affect and well-groomed  Gross Exam: unlevel shoulder heights R>L  Neck: muscle spasms and tenderness suboccipital, scalene, sternoclidomastoid and levator scapula and limited ROM rotation left  Cervical Dysfunction: Paraspinal Muscle: left, right, fullness and tenderness  Thoracic Dysfunction: Paraspinal Muscle: left, right, fullness and tenderness    Treatment    Verbal consent obtained to proceed with OMT treatment.    Osteopathic manipulative treatment was performed to 4 body regions including:   -head (OA)  -Cervical  -Thoracic  -Bilateral Upper extremity    (M99.00) Somatic dysfunction of head region  (primary encounter diagnosis)  Plan:   -OSTEOPATHIC MANIP,3-4 BODY REGN  -OA treated with suboccipital release    (M99.9) Nonallopathic lesion of cervical region  Plan: OSTEOPATHIC MANIP,3-4 BODY REGN  -cervical spine treated with direct myofascial release    (M99.02) Nonallopathic lesion of thoracic region  Plan: OSTEOPATHIC MANIP,3-4 BODY REGN  -upper thoracic spine treated with direct myofascial release    (M99.07) Somatic dysfunction of upper extremities  Plan: OSTEOPATHIC MANIP,3-4 BODY REGN  -Left trapezius muscle treated with counterstrain    Patient tolerated procedure well, reported pain relief after treatment. Encouraged to follow up as needed for OMT if she continues to experience neck/pack pain and headaches. Also encouraged use of ice/heating pad as well as stretches taught during visit at home.    Kami Crystal DO

## 2017-03-09 NOTE — MR AVS SNAPSHOT
After Visit Summary   3/9/2017    Romario Cervantes    MRN: [id number]           Patient Information     Date Of Birth          1975        Visit Information        Provider Department      3/9/2017 1:10 PM Kami Crystal DO [facility]        Today's Diagnoses     Somatic dysfunction of head region        Nonallopathic lesion of cervical region        Nonallopathic lesion of upper extremities           Follow-ups after your visit        Who to contact     Please call your clinic at [phone] to:    Ask questions about your health    Make or cancel appointments    Discuss your medicines    Learn about your test results    Speak to your doctor   If you have compliments or concerns about an experience at your clinic, or if you wish to file a complaint, please contact [facility] Patient Relations at [phone] or email us at [email or web address]         Additional Information About Your Visit        MyChart Information     [employer] is an electronic gateway that provides easy, online access to your medical records. With [employer], you can request a clinic appointment, read your test results, renew a prescription or communicate with your care team.     To sign up for [employer] visit the website at [email or web address]   You will be asked to enter the access code listed below, as well as some personal information. Please follow the directions to create your username and password.     Your access code is: [email or web address]  Expires: 2017  1:55 PM     Your access code will  in 90 days. If you need help or a new code, please contact your [facility] or call [phone] for assistance.        Care EveryWhere ID     This is your Care EveryWhere ID. This could be used by other organizations to access your [place] medical records  [id number]        Your Vitals Were     Pulse Temperature BMI (Body Mass Index)             62 99.2  F  (37.3  C) (Oral) 21.42 kg/m2          Blood Pressure from Last 3 Encounters:   03/09/17 113/71   02/20/17 102/66   02/01/17 106/70    Weight from Last 3 Encounters:   03/09/17 101 lb 9.6 oz (46.1 kg)   02/20/17 100 lb 4 oz (45.5 kg)   02/01/17 101 lb 4 oz (45.9 kg)              We Performed the Following     OSTEOPATHIC MANIP,3-4 BODY REGN        Primary Care Provider Office Phone # Fax #    Morena Hughes [phone]       [facility] [address] [place] [address]        Thank you!     Thank you for choosing [facility]  for your care. Our goal is always to provide you with excellent care. Hearing back from our patients is one way we can continue to improve our services. Please take a few minutes to complete the written survey that you may receive in the mail after your visit with us. Thank you!             Your Updated Medication List - Protect others around you: Learn how to safely use, store and throw away your medicines at www.disposemymeds.org.          This list is accurate as of: 3/9/17  1:42 PM.  Always use your most recent med list.                   Brand Name Dispense Instructions for use    acetaminophen 500 MG tablet    TYLENOL    100 tablet    Take 2 tablets (1,000 mg) by mouth every 6 hours as needed for mild pain Max 6 tablets (3000mg) per day.       calcium carbonate 500 MG chewable tablet    TUMS    150 tablet    Take 1 tablet (500 mg) by mouth every 2 hours as needed for heartburn       ibuprofen 600 MG tablet    ADVIL/MOTRIN    90 tablet    Take 1 tablet (600 mg) by mouth every 6 hours as needed for moderate pain       meclizine 25 MG Chew     90 tablet    Take 1 tablet (25 mg) by mouth every 6 hours as needed       * medroxyPROGESTERone 150 MG/ML injection    DEPO-PROVERA    3 mL    Inject 1 mL (150 mg) into the muscle every 3 months       * medroxyPROGESTERone 150 MG/ML injection    DEPO-PROVERA    1 mL    Inject 1 mL (150 mg) into the muscle every 3 months        ranitidine 150 MG tablet    ZANTAC    90 tablet    Take 1 tablet (150 mg) by mouth At Bedtime       zonisamide 100 MG capsule    ZONEGRAN    30 capsule    Take 2 capsules (200 mg) by mouth daily       * Notice:  This list has 2 medication(s) that are the same as other medications prescribed for you. Read the directions carefully, and ask your doctor or other care provider to review them with you.

## 2017-03-09 NOTE — PROGRESS NOTES
Preceptor attestation:  Patient seen and discussed with the resident. Assessment and plan reviewed with resident and agreed upon.  Supervising physician: Jimmie Camilo  [facility]

## 2017-05-08 ENCOUNTER — TELEPHONE (OUTPATIENT)
Dept: FAMILY MEDICINE | Facility: CLINIC | Age: 42
End: 2017-05-08

## 2017-05-08 NOTE — TELEPHONE ENCOUNTER
[facility] phone call message- general phone call:    Reason for call: The patient is there and they need a current med list.Please give a c all back asap.    Return call needed: Yes    OK to leave a message on voice mail? Yes    Primary language: Zoë      needed? Yes    Call taken on May 8, 2017 at 11:20 AM by Can Milner

## 2017-05-08 NOTE — TELEPHONE ENCOUNTER
"Tried dialing the number provided, but it says \"the number is not allocated.\" Will try calling later. /MCKENZIE Newman    "

## 2017-12-24 ENCOUNTER — HEALTH MAINTENANCE LETTER (OUTPATIENT)
Age: 42
End: 2017-12-24

## 2022-02-17 PROBLEM — Z78.9 USES BIRTH CONTROL: Status: ACTIVE | Noted: 2017-01-12
